# Patient Record
Sex: FEMALE | Race: WHITE | Employment: OTHER | URBAN - METROPOLITAN AREA
[De-identification: names, ages, dates, MRNs, and addresses within clinical notes are randomized per-mention and may not be internally consistent; named-entity substitution may affect disease eponyms.]

---

## 2019-09-06 ENCOUNTER — HOSPITAL ENCOUNTER (EMERGENCY)
Age: 74
Discharge: HOME | End: 2019-09-06
Payer: MEDICARE

## 2019-09-06 ENCOUNTER — APPOINTMENT (OUTPATIENT)
Dept: GENERAL RADIOLOGY | Age: 74
End: 2019-09-06
Payer: MEDICARE

## 2019-09-06 VITALS
HEART RATE: 84 BPM | TEMPERATURE: 98.7 F | RESPIRATION RATE: 18 BRPM | OXYGEN SATURATION: 96 % | SYSTOLIC BLOOD PRESSURE: 173 MMHG | BODY MASS INDEX: 29.99 KG/M2 | WEIGHT: 180 LBS | HEIGHT: 65 IN | DIASTOLIC BLOOD PRESSURE: 74 MMHG

## 2019-09-06 DIAGNOSIS — S52.502A CLOSED FRACTURE OF DISTAL END OF LEFT RADIUS, UNSPECIFIED FRACTURE MORPHOLOGY, INITIAL ENCOUNTER: Primary | ICD-10-CM

## 2019-09-06 PROCEDURE — 73110 X-RAY EXAM OF WRIST: CPT

## 2019-09-06 PROCEDURE — 73130 X-RAY EXAM OF HAND: CPT

## 2019-09-06 PROCEDURE — 99283 EMERGENCY DEPT VISIT LOW MDM: CPT

## 2019-09-06 PROCEDURE — 73090 X-RAY EXAM OF FOREARM: CPT

## 2019-09-06 PROCEDURE — 6370000000 HC RX 637 (ALT 250 FOR IP): Performed by: NURSE PRACTITIONER

## 2019-09-06 RX ORDER — IBUPROFEN 800 MG/1
800 TABLET ORAL EVERY 8 HOURS PRN
Qty: 21 TABLET | Refills: 0 | Status: SHIPPED | OUTPATIENT
Start: 2019-09-06 | End: 2020-06-24

## 2019-09-06 RX ORDER — OXYCODONE HYDROCHLORIDE AND ACETAMINOPHEN 5; 325 MG/1; MG/1
1 TABLET ORAL ONCE
Status: COMPLETED | OUTPATIENT
Start: 2019-09-06 | End: 2019-09-06

## 2019-09-06 RX ORDER — OXYCODONE HYDROCHLORIDE AND ACETAMINOPHEN 5; 325 MG/1; MG/1
1 TABLET ORAL EVERY 6 HOURS PRN
Qty: 10 TABLET | Refills: 0 | Status: SHIPPED | OUTPATIENT
Start: 2019-09-06 | End: 2019-09-09

## 2019-09-06 RX ADMIN — OXYCODONE HYDROCHLORIDE AND ACETAMINOPHEN 1 TABLET: 5; 325 TABLET ORAL at 19:21

## 2019-09-06 ASSESSMENT — PAIN DESCRIPTION - DESCRIPTORS: DESCRIPTORS: THROBBING

## 2019-09-06 ASSESSMENT — PAIN DESCRIPTION - ORIENTATION: ORIENTATION: LEFT

## 2019-09-06 ASSESSMENT — PAIN DESCRIPTION - LOCATION: LOCATION: WRIST

## 2019-09-06 ASSESSMENT — PAIN SCALES - GENERAL: PAINLEVEL_OUTOF10: 8

## 2019-09-06 ASSESSMENT — PAIN DESCRIPTION - PAIN TYPE: TYPE: ACUTE PAIN

## 2019-09-06 ASSESSMENT — PAIN DESCRIPTION - FREQUENCY: FREQUENCY: CONTINUOUS

## 2020-06-24 ENCOUNTER — OFFICE VISIT (OUTPATIENT)
Dept: PODIATRY | Age: 75
End: 2020-06-24
Payer: MEDICARE

## 2020-06-24 VITALS
HEIGHT: 66 IN | DIASTOLIC BLOOD PRESSURE: 72 MMHG | SYSTOLIC BLOOD PRESSURE: 138 MMHG | BODY MASS INDEX: 29.73 KG/M2 | WEIGHT: 185 LBS | TEMPERATURE: 98.6 F

## 2020-06-24 PROCEDURE — 99203 OFFICE O/P NEW LOW 30 MIN: CPT | Performed by: PODIATRIST

## 2020-06-24 RX ORDER — AMLODIPINE BESYLATE 10 MG/1
TABLET ORAL
COMMUNITY
Start: 2020-06-20

## 2020-06-24 RX ORDER — OMEPRAZOLE 20 MG/1
40 CAPSULE, DELAYED RELEASE ORAL DAILY
COMMUNITY

## 2020-06-24 RX ORDER — METHYLPREDNISOLONE 4 MG/1
TABLET ORAL
Qty: 21 TABLET | Refills: 0 | Status: SHIPPED | OUTPATIENT
Start: 2020-06-24 | End: 2020-06-30

## 2020-06-24 RX ORDER — CONJUGATED ESTROGENS 0.3 MG/1
TABLET, FILM COATED ORAL
COMMUNITY
Start: 2020-05-07

## 2020-06-24 NOTE — PROGRESS NOTES
20  Devin Berman : 1945 Sex: female  Age: 69 y.o. Patient was referred by Bunny Shipley MD    Chief Complaint   Patient presents with    Foot Pain     pt was last seen in 2017 for right heel pain presents today for same issue sent for new xray today       SUBJECTIVE this patient who has not been seen since 2017 is seen for a new issue. Right heel pain. Patient states that about a month ago her right heel started bothering her. Patient has been doing some walking on a treadmill. No trauma noted. Pain is increasingly getting worse as the days go on. Patient pain started in the back of the heel it radiates up. HPI  Review of Systems  Const: Denies constitutional symptoms  Musculo: Denies symptoms other than stated above  Skin: Denies symptoms other than stated above       Current Outpatient Medications:     amLODIPine (NORVASC) 10 MG tablet, , Disp: , Rfl:     PREMARIN 0.3 MG tablet, , Disp: , Rfl:     omeprazole (PRILOSEC) 20 MG delayed release capsule, Take 40 mg by mouth daily, Disp: , Rfl:     methylPREDNISolone (MEDROL DOSEPACK) 4 MG tablet, Take by mouth., Disp: 21 tablet, Rfl: 0  Allergies   Allergen Reactions    Prednisone Nausea Only       No past medical history on file. No past surgical history on file. No family history on file.   Social History     Socioeconomic History    Marital status:      Spouse name: Not on file    Number of children: Not on file    Years of education: Not on file    Highest education level: Not on file   Occupational History    Not on file   Social Needs    Financial resource strain: Not on file    Food insecurity     Worry: Not on file     Inability: Not on file    Transportation needs     Medical: Not on file     Non-medical: Not on file   Tobacco Use    Smoking status: Never Smoker    Smokeless tobacco: Never Used   Substance and Sexual Activity    Alcohol use: Not on file    Drug use: Not on file    Sexual activity: Not on file   Lifestyle    Physical activity     Days per week: Not on file     Minutes per session: Not on file    Stress: Not on file   Relationships    Social connections     Talks on phone: Not on file     Gets together: Not on file     Attends Sikh service: Not on file     Active member of club or organization: Not on file     Attends meetings of clubs or organizations: Not on file     Relationship status: Not on file    Intimate partner violence     Fear of current or ex partner: Not on file     Emotionally abused: Not on file     Physically abused: Not on file     Forced sexual activity: Not on file   Other Topics Concern    Not on file   Social History Narrative    Not on file       Vitals:    06/24/20 1555   BP: 138/72   Temp: 98.6 °F (37 °C)   TempSrc: Temporal   Weight: 185 lb (83.9 kg)   Height: 5' 5.5\" (1.664 m)       Focused Lower Extremity Physical Exam:  Vitals:    06/24/20 1555   BP: 138/72   Temp: 98.6 °F (37 °C)        Foot Exam    General  General Appearance: appears stated age and healthy   Orientation: alert and oriented to person, place, and time       Right Foot/Ankle     Inspection and Palpation  Ecchymosis: none  Tenderness: calcaneus tenderness and bony tenderness (There is pain with palpation at direct insertion of the Achilles tendon and calcaneus. Pain with dorsiflexion)  Swelling: none   Arch: normal  Skin Exam: skin intact;      Neurovascular  Dorsalis pedis: 3+  Posterior tibial: 3+  Saphenous nerve sensation: normal  Tibial nerve sensation: normal  Superficial peroneal nerve sensation: normal  Deep peroneal nerve sensation: normal  Sural nerve sensation: normal  Achilles reflex: 2+  Babinski reflex: 2+    Muscle Strength  Ankle dorsiflexion: 5  Ankle plantar flexion: 5  Ankle inversion: 5  Ankle eversion: 5  Great toe extension: 5  Great toe flexion: 5    Range of Motion    Passive  Ankle dorsiflexion: pain  Ankle plantar flexion: pain    Active  Ankle dorsiflexion: pain  Ankle plantar flexion: pain    Tests  Anterior drawer: negative   Varus tilt: negative   Syndesmosis squeeze: negative   Calcaneal squeeze: positive   Juan's Sign: negative  Heel raise: pain     Comments  Xr Foot Right (min 3 Views)    Result Date: 6/24/2020  XRAY INTERPREATION Indication: PAIN Examination: 3 VIEWS Narrative: 3 views of the right foot show a large retrocalcaneal spur. No other osseous pathology or fractures noted  Interpretation: Retrocalcaneal spur               Right Ankle Exam     Muscle Strength   Dorsiflexion:  5/5  Plantar flexion:  5/5    Comments:  Xr Foot Right (min 3 Views)    Result Date: 6/24/2020  XRAY INTERPREATION Indication: PAIN Examination: 3 VIEWS Narrative: 3 views of the right foot show a large retrocalcaneal spur. No other osseous pathology or fractures noted  Interpretation: Retrocalcaneal spur                Vascular: pulses  dp  pt    Capillary Refill Time:   Hair growth  Skin:    Edema:    Neurologic:      Musculoskeletal/ Orthopedic examination:    NAIL   Web space   Derm:          Assessment and Plan: Today we discussed treatment options there was a large retrocalcaneal spur noted I do not feel that the Achilles tendon has ruptured or even a sprain I do feel that the spurs causing all the bursitis. The patient has a cam walker at home we are placing the patient in a cam walker for 2 to 3 weeks limited weightbearing icing I did prescribe a Medrol Dosepak patient stated that she has had this in the past without any issues. Rachel Smith was seen today for foot pain. Diagnoses and all orders for this visit:    Plantar fascial fibromatosis  -     XR FOOT RIGHT (MIN 3 VIEWS); Future    Calcaneal spur of both feet  -     Cancel: NC PNEUMAT WALKING BOOT PRE CST    Other orders  -     methylPREDNISolone (MEDROL DOSEPACK) 4 MG tablet; Take by mouth. Return in about 1 month (around 7/24/2020).       Seen By:  Sunita Pat DPM      Document was created using voice recognition software. Note was reviewed, however may contain grammatical errors.

## 2020-07-08 ENCOUNTER — OFFICE VISIT (OUTPATIENT)
Dept: PODIATRY | Age: 75
End: 2020-07-08
Payer: MEDICARE

## 2020-07-08 VITALS
DIASTOLIC BLOOD PRESSURE: 73 MMHG | SYSTOLIC BLOOD PRESSURE: 118 MMHG | HEIGHT: 66 IN | BODY MASS INDEX: 30.32 KG/M2 | TEMPERATURE: 97.3 F

## 2020-07-08 PROCEDURE — 99213 OFFICE O/P EST LOW 20 MIN: CPT | Performed by: PODIATRIST

## 2020-07-08 NOTE — PROGRESS NOTES
Not on file   Lifestyle    Physical activity     Days per week: Not on file     Minutes per session: Not on file    Stress: Not on file   Relationships    Social connections     Talks on phone: Not on file     Gets together: Not on file     Attends Christian service: Not on file     Active member of club or organization: Not on file     Attends meetings of clubs or organizations: Not on file     Relationship status: Not on file    Intimate partner violence     Fear of current or ex partner: Not on file     Emotionally abused: Not on file     Physically abused: Not on file     Forced sexual activity: Not on file   Other Topics Concern    Not on file   Social History Narrative    Not on file       Vitals:    07/08/20 1553   BP: 118/73   Temp: 97.3 °F (36.3 °C)   TempSrc: Temporal   Weight: Comment: refused   Height: 5' 5.5\" (1.664 m)       Focused Lower Extremity Physical Exam:  Vitals:    07/08/20 1553   BP: 118/73   Temp: 97.3 °F (36.3 °C)        Foot Exam    General  General Appearance: appears stated age and healthy   Orientation: alert and oriented to person, place, and time       Right Foot/Ankle     Inspection and Palpation  Ecchymosis: none  Tenderness: calcaneus tenderness and bony tenderness (There is pain with palpation at direct insertion of the Achilles tendon and calcaneus. Pain with dorsiflexion)  Swelling: none   Arch: normal  Skin Exam: skin intact;      Neurovascular  Dorsalis pedis: 3+  Posterior tibial: 3+  Saphenous nerve sensation: normal  Tibial nerve sensation: normal  Superficial peroneal nerve sensation: normal  Deep peroneal nerve sensation: normal  Sural nerve sensation: normal  Achilles reflex: 2+  Babinski reflex: 2+    Muscle Strength  Ankle dorsiflexion: 5  Ankle plantar flexion: 5  Ankle inversion: 5  Ankle eversion: 5  Great toe extension: 5  Great toe flexion: 5    Range of Motion    Passive  Ankle dorsiflexion: pain  Ankle plantar flexion: pain    Active  Ankle dorsiflexion: pain  Ankle plantar flexion: pain    Tests  Anterior drawer: negative   Varus tilt: negative   Syndesmosis squeeze: negative   Calcaneal squeeze: positive   Juan's Sign: negative  Heel raise: pain     Comments  Xr Foot Right (min 3 Views)    Result Date: 6/24/2020  XRAY INTERPREATION Indication: PAIN Examination: 3 VIEWS Narrative: 3 views of the right foot show a large retrocalcaneal spur. No other osseous pathology or fractures noted  Interpretation: Retrocalcaneal spur               Right Ankle Exam     Muscle Strength   Dorsiflexion:  5/5  Plantar flexion:  5/5    Comments:  Xr Foot Right (min 3 Views)    Result Date: 6/24/2020  XRAY INTERPREATION Indication: PAIN Examination: 3 VIEWS Narrative: 3 views of the right foot show a large retrocalcaneal spur. No other osseous pathology or fractures noted  Interpretation: Retrocalcaneal spur                Vascular: pulses  dp  pt    Capillary Refill Time:   Hair growth  Skin:    Edema:    Neurologic:      Musculoskeletal/ Orthopedic examination:  Pain along  haglunds  Deformity  Pain posterior achilles tendon insertion    NAIL   Web space   Derm:          Assessment and Plan: Today we discussed treatment options there was a large retrocalcaneal spur noted I do not feel that the Achilles tendon has ruptured or even a sprain I do feel that the spurs causing all the bursitis. Continue with ice  Cam walker  At home   Stretching    Katheleen Morning was seen today for foot pain. Diagnoses and all orders for this visit:    Plantar fascial fibromatosis    Calcaneal spur of both feet        Return in about 1 month (around 8/8/2020). Seen By:  Mary Hogan DPM      Document was created using voice recognition software. Note was reviewed, however may contain grammatical errors.

## 2021-05-18 ENCOUNTER — HOSPITAL ENCOUNTER (OUTPATIENT)
Age: 76
Discharge: HOME | End: 2021-05-20

## 2021-05-18 PROCEDURE — 88305 TISSUE EXAM BY PATHOLOGIST: CPT

## 2021-07-07 ENCOUNTER — OFFICE VISIT (OUTPATIENT)
Dept: PODIATRY | Age: 76
End: 2021-07-07
Payer: MEDICARE

## 2021-07-07 DIAGNOSIS — M79.672 PAIN IN LEFT FOOT: ICD-10-CM

## 2021-07-07 DIAGNOSIS — M65.28 CALCIFIC ACHILLES TENDINITIS OF BOTH LOWER EXTREMITIES: ICD-10-CM

## 2021-07-07 DIAGNOSIS — M79.671 PAIN IN RIGHT FOOT: Primary | ICD-10-CM

## 2021-07-07 PROCEDURE — 99213 OFFICE O/P EST LOW 20 MIN: CPT | Performed by: PODIATRIST

## 2021-07-07 RX ORDER — METHYLPREDNISOLONE 4 MG/1
TABLET ORAL
Qty: 21 TABLET | Refills: 0 | Status: SHIPPED | OUTPATIENT
Start: 2021-07-07 | End: 2021-07-13

## 2021-07-07 NOTE — PROGRESS NOTES
21  Js Moreno : 1945 Sex: female  Age: 70 y.o. Patient was referred by Aimee Murphy MD    Chief Complaint   Patient presents with    Foot Pain     b/l right retro spurs this week right is worse then left was seen over a year ago        SUBJECTIVE patient is seen today for a new issue right and left heel pain the pain is at a different location. Right is worse than left. Patient states that about 2 or 3 weeks ago had a right heel on the back started bothering her no trauma noted. Patient relates pain has been increasingly getting worse with activities and standing. HPI  Review of Systems  Const: Denies constitutional symptoms  Musculo: Denies symptoms other than stated above  Skin: Denies symptoms other than stated above       Current Outpatient Medications:     ciclopirox (PENLAC) 8 % solution, Apply topically nightly., Disp: 6 mL, Rfl: 2    methylPREDNISolone (MEDROL DOSEPACK) 4 MG tablet, Take by mouth. as directed, Disp: 21 tablet, Rfl: 0    amLODIPine (NORVASC) 10 MG tablet, , Disp: , Rfl:     PREMARIN 0.3 MG tablet, , Disp: , Rfl:     omeprazole (PRILOSEC) 20 MG delayed release capsule, Take 40 mg by mouth daily, Disp: , Rfl:   Allergies   Allergen Reactions    Prednisone Nausea Only       No past medical history on file. No past surgical history on file. No family history on file.   Social History     Socioeconomic History    Marital status:      Spouse name: Not on file    Number of children: Not on file    Years of education: Not on file    Highest education level: Not on file   Occupational History    Not on file   Tobacco Use    Smoking status: Never Smoker    Smokeless tobacco: Never Used   Substance and Sexual Activity    Alcohol use: Not on file    Drug use: Not on file    Sexual activity: Not on file   Other Topics Concern    Not on file   Social History Narrative    Not on file     Social Determinants of Health     Financial Resource Strain:     Difficulty of Paying Living Expenses:    Food Insecurity:     Worried About Running Out of Food in the Last Year:     [address] in the Last Year:    Transportation Needs:     Lack of Transportation (Medical):  Lack of Transportation (Non-Medical):    Physical Activity:     Days of Exercise per Week:     Minutes of Exercise per Session:    Stress:     Feeling of Stress :    Social Connections:     Frequency of Communication with Friends and Family:     Frequency of Social Gatherings with Friends and Family:     Attends Worship Services:     Active Member of Clubs or Organizations:     Attends Club or Organization Meetings:     Marital Status:    Intimate Partner Violence:     Fear of Current or Ex-Partner:     Emotionally Abused:     Physically Abused:     Sexually Abused: There were no vitals filed for this visit. Focused Lower Extremity Physical Exam:  There were no vitals filed for this visit. Foot Exam    General  General Appearance: appears stated age and healthy   Orientation: alert and oriented to person, place, and time       Right Foot/Ankle     Inspection and Palpation  Tenderness: calcaneus tenderness and bony tenderness   Skin Exam: skin intact;      Neurovascular  Dorsalis pedis: 3+  Posterior tibial: 3+  Saphenous nerve sensation: normal  Tibial nerve sensation: normal  Superficial peroneal nerve sensation: normal  Deep peroneal nerve sensation: normal  Sural nerve sensation: normal  Achilles reflex: 2+  Babinski reflex: 2+    Muscle Strength  Ankle dorsiflexion: 5  Ankle plantar flexion: 5  Ankle inversion: 5  Ankle eversion: 5  Great toe extension: 5  Great toe flexion: 5    Range of Motion    Passive  Ankle dorsiflexion: pain    Active  Ankle dorsiflexion: pain    Tests  Calcaneal squeeze: positive   Russ squeeze: negative   PT Tinel's sign: negative               Right Ankle Exam     Muscle Strength   Dorsiflexion:  5/5  Plantar flexion: 5/5            Vascular: pulses  dp  pt  palapble  Capillary Refill Time:   Hair growth  Skin:    Edema:    Neurologic:      Musculoskeletal/ Orthopedic examination: Pain with palpation to the posterior aspect of the right heel. Pain with palpation at the insertion of the Achilles tendon and calcaneus. There is pain with dorsiflexion negative pain with plantarflexion muscle strength was on the right side and within normal limits +5/5. NAIL   Web space   Derm:     X-rays were taken showing a large posterior calcaneal spur    Assessment and Plan: Today we reviewed the x-rays with the patient patient has a cam walker from earlier. I placed the patient in a cam walker limited weightbearing icing prednisone Medrol Dosepak patient is able to take Medrol Dosepak without having any side effects. She will be reevaluated in 2 to 3 weeks possible nonweightbearing casting or MRI. Return in about 2 weeks (around 7/21/2021). Seen By:  Marquis Jan DPM      Document was created using voice recognition software. Note was reviewed, however may contain grammatical errors.

## 2021-07-13 ENCOUNTER — TELEPHONE (OUTPATIENT)
Dept: PODIATRY | Age: 76
End: 2021-07-13

## 2021-07-13 NOTE — TELEPHONE ENCOUNTER
Pt wants a handicapp sticker printed and left up front for  in [facility]

## 2021-07-27 ENCOUNTER — OFFICE VISIT (OUTPATIENT)
Dept: PODIATRY | Age: 76
End: 2021-07-27
Payer: MEDICARE

## 2021-07-27 VITALS — DIASTOLIC BLOOD PRESSURE: 78 MMHG | SYSTOLIC BLOOD PRESSURE: 123 MMHG | TEMPERATURE: 97.8 F

## 2021-07-27 DIAGNOSIS — M79.671 PAIN IN RIGHT FOOT: Primary | ICD-10-CM

## 2021-07-27 DIAGNOSIS — M77.32 CALCANEAL SPUR OF BOTH FEET: ICD-10-CM

## 2021-07-27 DIAGNOSIS — M72.2 PLANTAR FASCIAL FIBROMATOSIS: ICD-10-CM

## 2021-07-27 DIAGNOSIS — M65.28 CALCIFIC ACHILLES TENDINITIS OF BOTH LOWER EXTREMITIES: ICD-10-CM

## 2021-07-27 DIAGNOSIS — M79.672 PAIN IN LEFT FOOT: ICD-10-CM

## 2021-07-27 DIAGNOSIS — M77.31 CALCANEAL SPUR OF BOTH FEET: ICD-10-CM

## 2021-07-27 PROCEDURE — 99213 OFFICE O/P EST LOW 20 MIN: CPT | Performed by: PODIATRIST

## 2021-07-27 RX ORDER — METHYLPREDNISOLONE 4 MG/1
TABLET ORAL
Qty: 21 TABLET | Refills: 1 | Status: SHIPPED | OUTPATIENT
Start: 2021-07-27 | End: 2021-08-02

## 2021-07-27 NOTE — PROGRESS NOTES
21  Magali Del : 1945 Sex: female  Age: 70 y.o. Patient was referred by Sabra Arroyo MD    Chief Complaint   Patient presents with    Foot Pain     right in cam walker since  stopped wearing it  and finished the PRednisone        SUBJECTIVE patient is seen today for follow-up of right Achilles tendinitis. Patient is still having pain patient did start the Medrol Dosepak did not affect her in any way but did help with pain. HPI  Review of Systems  Const: Denies constitutional symptoms  Musculo: Denies symptoms other than stated above  Skin: Denies symptoms other than stated above       Current Outpatient Medications:     methylPREDNISolone (MEDROL DOSEPACK) 4 MG tablet, Take by mouth., Disp: 21 tablet, Rfl: 1    Handicap Placard MISC, by Does not apply route 6 year duration  Pt unable to walk 30 feet do to foot pain chronic, Disp: 1 each, Rfl: 0    ciclopirox (PENLAC) 8 % solution, Apply topically nightly., Disp: 6 mL, Rfl: 2    amLODIPine (NORVASC) 10 MG tablet, , Disp: , Rfl:     PREMARIN 0.3 MG tablet, , Disp: , Rfl:     omeprazole (PRILOSEC) 20 MG delayed release capsule, Take 40 mg by mouth daily, Disp: , Rfl:   Allergies   Allergen Reactions    Prednisone Nausea Only       No past medical history on file. No past surgical history on file. No family history on file.   Social History     Socioeconomic History    Marital status:      Spouse name: Not on file    Number of children: Not on file    Years of education: Not on file    Highest education level: Not on file   Occupational History    Not on file   Tobacco Use    Smoking status: Never Smoker    Smokeless tobacco: Never Used   Substance and Sexual Activity    Alcohol use: Not on file    Drug use: Not on file    Sexual activity: Not on file   Other Topics Concern    Not on file   Social History Narrative    Not on file     Social Determinants of Health     Financial Resource Strain:    Kobe Carter Difficulty of Paying Living Expenses:    Food Insecurity:     Worried About Running Out of Food in the Last Year:     [address] in the Last Year:    Transportation Needs:     Lack of Transportation (Medical):  Lack of Transportation (Non-Medical):    Physical Activity:     Days of Exercise per Week:     Minutes of Exercise per Session:    Stress:     Feeling of Stress :    Social Connections:     Frequency of Communication with Friends and Family:     Frequency of Social Gatherings with Friends and Family:     Attends Islam Services:     Active Member of Clubs or Organizations:     Attends Club or Organization Meetings:     Marital Status:    Intimate Partner Violence:     Fear of Current or Ex-Partner:     Emotionally Abused:     Physically Abused:     Sexually Abused:        Vitals:    07/27/21 1159   BP: 123/78   Temp: 97.8 °F (36.6 °C)   TempSrc: Temporal       Focused Lower Extremity Physical Exam:  Vitals:    07/27/21 1159   BP: 123/78   Temp: 97.8 °F (36.6 °C)        Foot Exam    General  General Appearance: appears stated age and healthy   Orientation: alert and oriented to person, place, and time       Right Foot/Ankle     Inspection and Palpation  Tenderness: bony tenderness   Skin Exam: skin intact;      Neurovascular  Dorsalis pedis: 3+  Posterior tibial: 3+  Saphenous nerve sensation: normal  Tibial nerve sensation: normal  Superficial peroneal nerve sensation: normal  Deep peroneal nerve sensation: normal  Sural nerve sensation: normal  Achilles reflex: 2+  Babinski reflex: 2+    Muscle Strength  Ankle dorsiflexion: 5  Ankle plantar flexion: 5  Ankle inversion: 5  Ankle eversion: 5  Great toe extension: 5  Great toe flexion: 5    Range of Motion    Passive  Ankle dorsiflexion: pain    Active  Ankle dorsiflexion: pain    Tests  Calcaneal squeeze: positive              Right Ankle Exam     Muscle Strength   Dorsiflexion:  5/5  Plantar flexion:  5/5            Vascular: pulses dp  pt  palapble  Capillary Refill Time:   Hair growth  Skin:    Edema:    Neurologic:      Musculoskeletal/ Orthopedic examination: Pain with palpation to the posterior lateral aspect of the right calcaneus pain with palpation at the insertion of the Achilles tendon and the calcaneus. Pain with dorsiflexion plantarflexion inversion eversion right foot. NAIL   Web space   Derm:         Assessment and Plan: Today the patient is to resume the cam walker I am ordering an MRI to rule out any disruption in tendon we did discuss possible surgical correction in the future. Oziel Gallegos was seen today for foot pain. Diagnoses and all orders for this visit:    Pain in right foot    Pain in left foot    Calcific Achilles tendinitis of both lower extremities    Plantar fascial fibromatosis    Calcaneal spur of both feet    Other orders  -     methylPREDNISolone (MEDROL DOSEPACK) 4 MG tablet; Take by mouth. No follow-ups on file. Seen By:  Kasie Freeman DPM      Document was created using voice recognition software. Note was reviewed, however may contain grammatical errors.

## 2023-09-05 ENCOUNTER — OFFICE VISIT (OUTPATIENT)
Dept: PODIATRY | Age: 78
End: 2023-09-05
Payer: MEDICARE

## 2023-09-05 VITALS
SYSTOLIC BLOOD PRESSURE: 136 MMHG | BODY MASS INDEX: 30.65 KG/M2 | WEIGHT: 187 LBS | TEMPERATURE: 97.2 F | DIASTOLIC BLOOD PRESSURE: 80 MMHG

## 2023-09-05 DIAGNOSIS — M65.28 CALCIFIC ACHILLES TENDINITIS OF RIGHT LOWER EXTREMITY: ICD-10-CM

## 2023-09-05 DIAGNOSIS — M79.671 RIGHT FOOT PAIN: Primary | ICD-10-CM

## 2023-09-05 DIAGNOSIS — M77.31 BONE SPUR OF POSTERIOR PORTION OF RIGHT CALCANEUS: ICD-10-CM

## 2023-09-05 PROCEDURE — 99213 OFFICE O/P EST LOW 20 MIN: CPT | Performed by: PODIATRIST

## 2023-09-05 PROCEDURE — 1123F ACP DISCUSS/DSCN MKR DOCD: CPT | Performed by: PODIATRIST

## 2023-09-05 RX ORDER — UBIDECARENONE 75 MG
50 CAPSULE ORAL DAILY
COMMUNITY

## 2023-09-05 RX ORDER — CHOLECALCIFEROL (VITAMIN D3) 1250 MCG
CAPSULE ORAL
COMMUNITY

## 2024-04-18 ENCOUNTER — OFFICE VISIT (OUTPATIENT)
Dept: PODIATRY | Age: 79
End: 2024-04-18
Payer: MEDICARE

## 2024-04-18 VITALS
WEIGHT: 187 LBS | SYSTOLIC BLOOD PRESSURE: 124 MMHG | DIASTOLIC BLOOD PRESSURE: 78 MMHG | TEMPERATURE: 98.7 F | BODY MASS INDEX: 30.65 KG/M2

## 2024-04-18 DIAGNOSIS — B35.1 TINEA UNGUIUM: ICD-10-CM

## 2024-04-18 DIAGNOSIS — M77.31 BONE SPUR OF POSTERIOR PORTION OF RIGHT CALCANEUS: Primary | ICD-10-CM

## 2024-04-18 PROCEDURE — 1123F ACP DISCUSS/DSCN MKR DOCD: CPT | Performed by: PODIATRIST

## 2024-04-18 PROCEDURE — 99213 OFFICE O/P EST LOW 20 MIN: CPT | Performed by: PODIATRIST

## 2024-04-18 RX ORDER — TERBINAFINE HYDROCHLORIDE 250 MG/1
250 TABLET ORAL DAILY
Qty: 60 TABLET | Refills: 0 | Status: SHIPPED | OUTPATIENT
Start: 2024-04-18 | End: 2024-06-17

## 2024-04-18 NOTE — PROGRESS NOTES
24  Shikha Land : 1945 Sex: female  Age: 78 y.o.    Patient was referred by Jerome Valdez MD    Chief Complaint   Patient presents with    Foot Pain     Right achilles flared up again pt went to PCP they put her on Prednisone which helped       Toe Pain    Nail Problem     Toenail fungus Jerome Valdez MD  2024  Pt was last seen in  for same issue        SUBJECTIVE patient is seen today for right Achilles tendinitis it was doing really bad but she was placed on prednisone and Tylenol feeling much improvement today she is here for fungus toenail check right great toe.  Foot Pain     Toe Pain       Review of Systems  Const: Denies constitutional symptoms  Musculo: Denies symptoms other than stated above  Skin: Denies symptoms other than stated above       Current Outpatient Medications:     terbinafine (LAMISIL) 250 MG tablet, Take 1 tablet by mouth daily, Disp: 60 tablet, Rfl: 0    vitamin B-12 (CYANOCOBALAMIN) 100 MCG tablet, Take 0.5 tablets by mouth daily, Disp: , Rfl:     Cholecalciferol (VITAMIN D3) 1.25 MG (87035 UT) CAPS, Take by mouth, Disp: , Rfl:     Handicap Placard MISC, by Does not apply route 6 year duration  Pt unable to walk 30 feet do to foot pain chronic, Disp: 1 each, Rfl: 0    ciclopirox (PENLAC) 8 % solution, Apply topically nightly., Disp: 6 mL, Rfl: 2    amLODIPine (NORVASC) 10 MG tablet, , Disp: , Rfl:     PREMARIN 0.3 MG tablet, , Disp: , Rfl:     omeprazole (PRILOSEC) 20 MG delayed release capsule, Take 2 capsules by mouth daily, Disp: , Rfl:     Biotin 100 MG/GM POWD, Take by mouth (Patient not taking: Reported on 2024), Disp: , Rfl:   No Known Allergies    No past medical history on file.  No past surgical history on file.  No family history on file.  Social History     Socioeconomic History    Marital status:      Spouse name: Not on file    Number of children: Not on file    Years of education: Not on file    Highest education level:

## 2024-04-24 ENCOUNTER — TELEPHONE (OUTPATIENT)
Dept: PODIATRY | Age: 79
End: 2024-04-24

## 2024-04-24 RX ORDER — PREDNISONE 10 MG/1
TABLET ORAL
Qty: 18 TABLET | Refills: 1 | Status: SHIPPED | OUTPATIENT
Start: 2024-04-24

## 2024-04-30 DIAGNOSIS — B35.1 TINEA UNGUIUM: ICD-10-CM

## 2024-04-30 LAB
ALBUMIN: 4.3 G/DL (ref 3.5–5.2)
ALP BLD-CCNC: 64 U/L (ref 35–104)
ALT SERPL-CCNC: 6 U/L (ref 0–32)
AST SERPL-CCNC: 15 U/L (ref 0–31)
BILIRUB SERPL-MCNC: 0.2 MG/DL (ref 0–1.2)
BILIRUBIN DIRECT: <0.2 MG/DL (ref 0–0.3)
BILIRUBIN, INDIRECT: NORMAL MG/DL (ref 0–1)
TOTAL PROTEIN: 7.4 G/DL (ref 6.4–8.3)

## 2024-06-06 ENCOUNTER — HOSPITAL ENCOUNTER (OUTPATIENT)
Age: 79
Discharge: HOME | End: 2024-06-08

## 2024-06-11 LAB — SURGICAL PATHOLOGY REPORT: NORMAL

## 2024-07-09 ENCOUNTER — OFFICE VISIT (OUTPATIENT)
Dept: PODIATRY | Age: 79
End: 2024-07-09
Payer: MEDICARE

## 2024-07-09 VITALS
BODY MASS INDEX: 30.15 KG/M2 | TEMPERATURE: 97.3 F | WEIGHT: 184 LBS | SYSTOLIC BLOOD PRESSURE: 112 MMHG | DIASTOLIC BLOOD PRESSURE: 62 MMHG

## 2024-07-09 DIAGNOSIS — M79.674 PAIN IN RIGHT TOE(S): ICD-10-CM

## 2024-07-09 DIAGNOSIS — B35.1 TINEA UNGUIUM: Primary | ICD-10-CM

## 2024-07-09 PROCEDURE — 1123F ACP DISCUSS/DSCN MKR DOCD: CPT | Performed by: PODIATRIST

## 2024-07-09 PROCEDURE — 99213 OFFICE O/P EST LOW 20 MIN: CPT | Performed by: PODIATRIST

## 2024-07-09 RX ORDER — PAROXETINE 10 MG/1
10 TABLET, FILM COATED ORAL DAILY
COMMUNITY
Start: 2024-06-24

## 2024-07-09 RX ORDER — TERBINAFINE HYDROCHLORIDE 250 MG/1
250 TABLET ORAL DAILY
Qty: 30 TABLET | Refills: 0 | Status: SHIPPED | OUTPATIENT
Start: 2024-07-09 | End: 2024-08-08

## 2024-07-09 NOTE — PROGRESS NOTES
Tobacco Use    Smoking status: Never    Smokeless tobacco: Never   Substance and Sexual Activity    Alcohol use: Not on file    Drug use: Not on file    Sexual activity: Not on file   Other Topics Concern    Not on file   Social History Narrative    Not on file     Social Determinants of Health     Financial Resource Strain: Not on file   Food Insecurity: Not on file   Transportation Needs: Not on file   Physical Activity: Not on file   Stress: Not on file   Social Connections: Not on file   Intimate Partner Violence: Not on file   Housing Stability: Not on file       Vitals:    07/09/24 1115   BP: 112/62   Temp: 97.3 °F (36.3 °C)   TempSrc: Temporal   Weight: 83.5 kg (184 lb)       Focused Lower Extremity Physical Exam:  Vitals:    07/09/24 1115   BP: 112/62   Temp: 97.3 °F (36.3 °C)        Foot Exam     Ortho Exam    Vascular: pulses  dp  pt    Capillary Refill Time:   Hair growth  Skin:    Edema:    Neurologic:      Musculoskeletal/ Orthopedic examination:    NAIL right hallux nail is about two thirds clear one third still fungus nail.  Web s        Assessment and Plan: I today I placed her back on Lamisil for 30 days she will continue monitoring the right great toe as for the heel pain this is doing very well prednisone as needed reappoint 3 months  Shikha was seen today for nail problem and toe pain.    Diagnoses and all orders for this visit:    Tinea unguium    Pain in right toe(s)    Other orders  -     terbinafine (LAMISIL) 250 MG tablet; Take 1 tablet by mouth daily        No follow-ups on file.      Seen By:  Chris Batista DPM      Document was created using voice recognition software.  Note was reviewed, however may contain grammatical errors.

## 2024-08-12 ENCOUNTER — TELEPHONE (OUTPATIENT)
Dept: ADMINISTRATIVE | Age: 79
End: 2024-08-12

## 2024-08-12 NOTE — TELEPHONE ENCOUNTER
Pt having rt heel pain with difficulty walking.  She is requesting an appt, no availability until September.  Pt would like to be seen sooner.  Staff unavailable due to pt care.  Please contact pt.

## 2024-08-27 ENCOUNTER — OFFICE VISIT (OUTPATIENT)
Dept: PODIATRY | Age: 79
End: 2024-08-27
Payer: MEDICARE

## 2024-08-27 VITALS
DIASTOLIC BLOOD PRESSURE: 78 MMHG | TEMPERATURE: 98.2 F | WEIGHT: 184 LBS | SYSTOLIC BLOOD PRESSURE: 126 MMHG | BODY MASS INDEX: 30.15 KG/M2

## 2024-08-27 DIAGNOSIS — M77.31 BONE SPUR OF POSTERIOR PORTION OF RIGHT CALCANEUS: ICD-10-CM

## 2024-08-27 DIAGNOSIS — M65.28 CALCIFIC ACHILLES TENDINITIS OF RIGHT LOWER EXTREMITY: ICD-10-CM

## 2024-08-27 DIAGNOSIS — B35.1 TINEA UNGUIUM: ICD-10-CM

## 2024-08-27 DIAGNOSIS — M79.671 RIGHT FOOT PAIN: ICD-10-CM

## 2024-08-27 DIAGNOSIS — M79.671 PAIN IN RIGHT FOOT: Primary | ICD-10-CM

## 2024-08-27 PROCEDURE — 99213 OFFICE O/P EST LOW 20 MIN: CPT | Performed by: PODIATRIST

## 2024-08-27 PROCEDURE — 1123F ACP DISCUSS/DSCN MKR DOCD: CPT | Performed by: PODIATRIST

## 2024-10-10 ENCOUNTER — TELEPHONE (OUTPATIENT)
Dept: PODIATRY | Age: 79
End: 2024-10-10

## 2024-10-10 NOTE — TELEPHONE ENCOUNTER
Patient called in today wanting to speak to Yamilka about cancelling her appointment. Attempted to cancel for patient but patient would like to speak to the office. Thank you!

## 2025-01-09 ENCOUNTER — PROCEDURE VISIT (OUTPATIENT)
Dept: PODIATRY | Age: 80
End: 2025-01-09

## 2025-01-09 VITALS
BODY MASS INDEX: 30.15 KG/M2 | OXYGEN SATURATION: 98 % | HEART RATE: 76 BPM | DIASTOLIC BLOOD PRESSURE: 77 MMHG | WEIGHT: 184 LBS | TEMPERATURE: 97.4 F | SYSTOLIC BLOOD PRESSURE: 125 MMHG

## 2025-01-09 DIAGNOSIS — L60.0 INGROWN NAIL: Primary | ICD-10-CM

## 2025-01-09 DIAGNOSIS — M79.674 PAIN IN RIGHT TOE(S): ICD-10-CM

## 2025-01-09 RX ORDER — GABAPENTIN 300 MG/1
300 CAPSULE ORAL DAILY
COMMUNITY
Start: 2024-12-29

## 2025-01-09 RX ORDER — LIDOCAINE HYDROCHLORIDE 20 MG/ML
3 INJECTION, SOLUTION INFILTRATION; PERINEURAL ONCE
Status: COMPLETED | OUTPATIENT
Start: 2025-01-09 | End: 2025-01-09

## 2025-01-09 RX ADMIN — LIDOCAINE HYDROCHLORIDE 3 ML: 20 INJECTION, SOLUTION INFILTRATION; PERINEURAL at 11:21

## 2025-01-09 NOTE — PROGRESS NOTES
in immediately if any problems and/or come in immediately if any problems and/or questions arise.  The patient will return to the office in one week for a check.       The patient was instructed to leave this dressing clean/dry/intact until the following am at which point they will begin application of antibiotic ointment/Amerigel and Band-Aid QD.  Patient instructed to take Tylenol or Ibuprofen PRN pain. Contact office with any questions/problems/concerns.     No orders of the defined types were placed in this encounter.     RTC in 2week(s).    1/9/2025

## 2025-01-09 NOTE — PATIENT INSTRUCTIONS
Starting day #2 soak foot in 1 Tablespoon of epsom salt and warm water for 15 minutes per day until Dr Batista tells you to stop soaking.    Apply Neosporin or triple antibiotic ointment and a Band-Aid.     Any Questions feel free to contact Yamilka VEGA at [phone]

## 2025-01-23 ENCOUNTER — OFFICE VISIT (OUTPATIENT)
Dept: PODIATRY | Age: 80
End: 2025-01-23
Payer: MEDICARE

## 2025-01-23 VITALS
SYSTOLIC BLOOD PRESSURE: 122 MMHG | TEMPERATURE: 97.1 F | OXYGEN SATURATION: 100 % | BODY MASS INDEX: 30.15 KG/M2 | WEIGHT: 184 LBS | DIASTOLIC BLOOD PRESSURE: 77 MMHG | HEART RATE: 70 BPM

## 2025-01-23 DIAGNOSIS — L60.0 INGROWN NAIL: Primary | ICD-10-CM

## 2025-01-23 PROCEDURE — 1090F PRES/ABSN URINE INCON ASSESS: CPT | Performed by: PODIATRIST

## 2025-01-23 PROCEDURE — G8400 PT W/DXA NO RESULTS DOC: HCPCS | Performed by: PODIATRIST

## 2025-01-23 PROCEDURE — G8419 CALC BMI OUT NRM PARAM NOF/U: HCPCS | Performed by: PODIATRIST

## 2025-01-23 PROCEDURE — 1159F MED LIST DOCD IN RCRD: CPT | Performed by: PODIATRIST

## 2025-01-23 PROCEDURE — 99213 OFFICE O/P EST LOW 20 MIN: CPT | Performed by: PODIATRIST

## 2025-01-23 PROCEDURE — 1036F TOBACCO NON-USER: CPT | Performed by: PODIATRIST

## 2025-01-23 PROCEDURE — 1123F ACP DISCUSS/DSCN MKR DOCD: CPT | Performed by: PODIATRIST

## 2025-01-23 PROCEDURE — G8427 DOCREV CUR MEDS BY ELIG CLIN: HCPCS | Performed by: PODIATRIST

## 2025-01-23 RX ORDER — CEPHALEXIN 500 MG/1
500 CAPSULE ORAL 2 TIMES DAILY
Qty: 20 CAPSULE | Refills: 0 | Status: SHIPPED | OUTPATIENT
Start: 2025-01-23 | End: 2025-02-02

## 2025-01-23 NOTE — PROGRESS NOTES
25  Shikha Land : 1945 Sex: female  Age: 79 y.o.    Patient was referred by Jerome Valdez MD    Chief Complaint   Patient presents with    Foot Pain     Right foot pain 2 week follow up. Tender to touch.  Jerome Valdez MD  25       SUBJECTIVE patient is seen today follow-up 2 weeks matrixectomy to the right hallux.  Having some difficulty and pain.  HPI  Review of Systems  Const: Denies constitutional symptoms  Musculo: Denies symptoms other than stated above  Skin: Denies symptoms other than stated above       Current Outpatient Medications:     cephALEXin (KEFLEX) 500 MG capsule, Take 1 capsule by mouth 2 times daily for 10 days, Disp: 20 capsule, Rfl: 0    gabapentin (NEURONTIN) 300 MG capsule, Take 1 capsule by mouth daily., Disp: , Rfl:     MULTIPLE VITAMIN PO, Multi Vitamin Active 2024 12:00am, Disp: , Rfl:     PARoxetine (PAXIL) 10 MG tablet, Take 1 tablet by mouth daily, Disp: , Rfl:     vitamin B-12 (CYANOCOBALAMIN) 100 MCG tablet, Take 0.5 tablets by mouth daily, Disp: , Rfl:     Biotin 100 MG/GM POWD, Take by mouth, Disp: , Rfl:     Cholecalciferol (VITAMIN D3) 1.25 MG (85352 UT) CAPS, Take by mouth, Disp: , Rfl:     Handicap Placard MISC, by Does not apply route 6 year duration  Pt unable to walk 30 feet do to foot pain chronic, Disp: 1 each, Rfl: 0    amLODIPine (NORVASC) 10 MG tablet, , Disp: , Rfl:     PREMARIN 0.3 MG tablet, , Disp: , Rfl:     omeprazole (PRILOSEC) 20 MG delayed release capsule, Take 2 capsules by mouth daily, Disp: , Rfl:   No Known Allergies    No past medical history on file.  No past surgical history on file.  No family history on file.  Social History     Socioeconomic History    Marital status:      Spouse name: Not on file    Number of children: Not on file    Years of education: Not on file    Highest education level: Not on file   Occupational History    Not on file   Tobacco Use    Smoking status: Never    Smokeless

## 2025-02-11 ENCOUNTER — OFFICE VISIT (OUTPATIENT)
Dept: PODIATRY | Age: 80
End: 2025-02-11
Payer: MEDICARE

## 2025-02-11 VITALS
WEIGHT: 184 LBS | DIASTOLIC BLOOD PRESSURE: 75 MMHG | TEMPERATURE: 97.6 F | OXYGEN SATURATION: 96 % | HEART RATE: 93 BPM | SYSTOLIC BLOOD PRESSURE: 130 MMHG | BODY MASS INDEX: 30.15 KG/M2

## 2025-02-11 DIAGNOSIS — M79.674 PAIN IN RIGHT TOE(S): ICD-10-CM

## 2025-02-11 DIAGNOSIS — L60.0 INGROWN NAIL: Primary | ICD-10-CM

## 2025-02-11 DIAGNOSIS — B35.1 TINEA UNGUIUM: ICD-10-CM

## 2025-02-11 PROCEDURE — 99212 OFFICE O/P EST SF 10 MIN: CPT | Performed by: PODIATRIST

## 2025-02-11 PROCEDURE — 1159F MED LIST DOCD IN RCRD: CPT | Performed by: PODIATRIST

## 2025-02-11 PROCEDURE — 1123F ACP DISCUSS/DSCN MKR DOCD: CPT | Performed by: PODIATRIST

## 2025-02-11 PROCEDURE — 1036F TOBACCO NON-USER: CPT | Performed by: PODIATRIST

## 2025-02-11 PROCEDURE — G8419 CALC BMI OUT NRM PARAM NOF/U: HCPCS | Performed by: PODIATRIST

## 2025-02-11 PROCEDURE — 1090F PRES/ABSN URINE INCON ASSESS: CPT | Performed by: PODIATRIST

## 2025-02-11 PROCEDURE — G8427 DOCREV CUR MEDS BY ELIG CLIN: HCPCS | Performed by: PODIATRIST

## 2025-02-11 PROCEDURE — G8400 PT W/DXA NO RESULTS DOC: HCPCS | Performed by: PODIATRIST

## 2025-02-11 NOTE — PROGRESS NOTES
25  Shikha Land : 1945 Sex: female  Age: 79 y.o.    Patient was referred by Jerome Valdez MD    Chief Complaint   Patient presents with    Ingrown Toenail     Jerome Valdez MD  LOV 10/10/24       SUBJECTIVE patient seen today for follow-up of an ingrown toenail and fungus toenails she is doing very well with no new issues.  HPI  Review of Systems  Const: Denies constitutional symptoms  Musculo: Denies symptoms other than stated above  Skin: Denies symptoms other than stated above       Current Outpatient Medications:     gabapentin (NEURONTIN) 300 MG capsule, Take 1 capsule by mouth daily., Disp: , Rfl:     MULTIPLE VITAMIN PO, Multi Vitamin Active 2024 12:00am, Disp: , Rfl:     PARoxetine (PAXIL) 10 MG tablet, Take 1 tablet by mouth daily, Disp: , Rfl:     vitamin B-12 (CYANOCOBALAMIN) 100 MCG tablet, Take 0.5 tablets by mouth daily, Disp: , Rfl:     Biotin 100 MG/GM POWD, Take by mouth, Disp: , Rfl:     Cholecalciferol (VITAMIN D3) 1.25 MG (32118 UT) CAPS, Take by mouth, Disp: , Rfl:     Handicap Placard MISC, by Does not apply route 6 year duration  Pt unable to walk 30 feet do to foot pain chronic, Disp: 1 each, Rfl: 0    amLODIPine (NORVASC) 10 MG tablet, , Disp: , Rfl:     PREMARIN 0.3 MG tablet, , Disp: , Rfl:     omeprazole (PRILOSEC) 20 MG delayed release capsule, Take 2 capsules by mouth daily, Disp: , Rfl:   No Known Allergies    No past medical history on file.  No past surgical history on file.  No family history on file.  Social History     Socioeconomic History    Marital status:      Spouse name: Not on file    Number of children: Not on file    Years of education: Not on file    Highest education level: Not on file   Occupational History    Not on file   Tobacco Use    Smoking status: Never    Smokeless tobacco: Never   Substance and Sexual Activity    Alcohol use: Not on file    Drug use: Not on file    Sexual activity: Not on file   Other Topics